# Patient Record
Sex: FEMALE | Race: BLACK OR AFRICAN AMERICAN | NOT HISPANIC OR LATINO | Employment: OTHER | URBAN - NONMETROPOLITAN AREA
[De-identification: names, ages, dates, MRNs, and addresses within clinical notes are randomized per-mention and may not be internally consistent; named-entity substitution may affect disease eponyms.]

---

## 2018-08-15 ENCOUNTER — HOSPITAL ENCOUNTER (EMERGENCY)
Facility: HOSPITAL | Age: 58
Discharge: HOME | End: 2018-08-15
Attending: EMERGENCY MEDICINE | Admitting: EMERGENCY MEDICINE

## 2018-08-15 VITALS
OXYGEN SATURATION: 98 % | DIASTOLIC BLOOD PRESSURE: 87 MMHG | HEART RATE: 64 BPM | WEIGHT: 258 LBS | TEMPERATURE: 98.8 F | HEIGHT: 66 IN | RESPIRATION RATE: 20 BRPM | SYSTOLIC BLOOD PRESSURE: 177 MMHG | BODY MASS INDEX: 41.46 KG/M2

## 2018-08-15 DIAGNOSIS — T78.40XA ALLERGIC REACTION, INITIAL ENCOUNTER: Primary | ICD-10-CM

## 2018-08-15 PROCEDURE — 99284 EMERGENCY DEPT VISIT MOD MDM: CPT

## 2018-08-15 PROCEDURE — 96374 THER/PROPH/DIAG INJ IV PUSH: CPT

## 2018-08-15 PROCEDURE — 25010000002 METHYLPREDNISOLONE PER 125 MG: Performed by: EMERGENCY MEDICINE

## 2018-08-15 PROCEDURE — 25010000002 DIPHENHYDRAMINE PER 50 MG: Performed by: EMERGENCY MEDICINE

## 2018-08-15 PROCEDURE — 96375 TX/PRO/DX INJ NEW DRUG ADDON: CPT

## 2018-08-15 RX ORDER — NEBIVOLOL 20 MG/1
20 TABLET ORAL DAILY
COMMUNITY

## 2018-08-15 RX ORDER — ACETAMINOPHEN 500 MG
1000 TABLET ORAL ONCE
Status: COMPLETED | OUTPATIENT
Start: 2018-08-15 | End: 2018-08-15

## 2018-08-15 RX ORDER — PREDNISONE 20 MG/1
40 TABLET ORAL DAILY
Qty: 8 TABLET | Refills: 0 | Status: SHIPPED | OUTPATIENT
Start: 2018-08-15 | End: 2018-08-19

## 2018-08-15 RX ORDER — POTASSIUM CHLORIDE 1.5 G/1.77G
20 POWDER, FOR SOLUTION ORAL 2 TIMES DAILY
COMMUNITY

## 2018-08-15 RX ORDER — VERAPAMIL HYDROCHLORIDE 300 MG/1
300 CAPSULE, EXTENDED RELEASE ORAL NIGHTLY
COMMUNITY

## 2018-08-15 RX ORDER — ASPIRIN 81 MG/1
81 TABLET ORAL DAILY
COMMUNITY

## 2018-08-15 RX ORDER — METHYLPREDNISOLONE SODIUM SUCCINATE 125 MG/2ML
125 INJECTION, POWDER, LYOPHILIZED, FOR SOLUTION INTRAMUSCULAR; INTRAVENOUS ONCE
Status: COMPLETED | OUTPATIENT
Start: 2018-08-15 | End: 2018-08-15

## 2018-08-15 RX ORDER — PANTOPRAZOLE SODIUM 40 MG/10ML
INJECTION, POWDER, LYOPHILIZED, FOR SOLUTION INTRAVENOUS
Status: COMPLETED
Start: 2018-08-15 | End: 2018-08-15

## 2018-08-15 RX ORDER — DIPHENHYDRAMINE HYDROCHLORIDE 50 MG/ML
25 INJECTION INTRAMUSCULAR; INTRAVENOUS ONCE
Status: COMPLETED | OUTPATIENT
Start: 2018-08-15 | End: 2018-08-15

## 2018-08-15 RX ORDER — SODIUM CHLORIDE 0.9 % (FLUSH) 0.9 %
10 SYRINGE (ML) INJECTION AS NEEDED
Status: DISCONTINUED | OUTPATIENT
Start: 2018-08-15 | End: 2018-08-15 | Stop reason: HOSPADM

## 2018-08-15 RX ORDER — CLONIDINE HYDROCHLORIDE 0.1 MG/1
0.1 TABLET ORAL 2 TIMES DAILY
COMMUNITY

## 2018-08-15 RX ORDER — PANTOPRAZOLE SODIUM 40 MG/10ML
40 INJECTION, POWDER, LYOPHILIZED, FOR SOLUTION INTRAVENOUS ONCE
Status: COMPLETED | OUTPATIENT
Start: 2018-08-15 | End: 2018-08-15

## 2018-08-15 RX ORDER — HYDRALAZINE HYDROCHLORIDE 100 MG/1
100 TABLET, FILM COATED ORAL 2 TIMES DAILY
COMMUNITY

## 2018-08-15 RX ADMIN — ACETAMINOPHEN 1000 MG: 500 TABLET ORAL at 20:14

## 2018-08-15 RX ADMIN — PANTOPRAZOLE SODIUM 40 MG: 40 INJECTION, POWDER, LYOPHILIZED, FOR SOLUTION INTRAVENOUS at 18:25

## 2018-08-15 RX ADMIN — PANTOPRAZOLE SODIUM 40 MG: 40 INJECTION, POWDER, FOR SOLUTION INTRAVENOUS at 18:25

## 2018-08-15 RX ADMIN — METHYLPREDNISOLONE SODIUM SUCCINATE 125 MG: 125 INJECTION, POWDER, FOR SOLUTION INTRAMUSCULAR; INTRAVENOUS at 18:25

## 2018-08-15 RX ADMIN — DIPHENHYDRAMINE HYDROCHLORIDE 25 MG: 50 INJECTION INTRAMUSCULAR; INTRAVENOUS at 18:25

## 2018-08-15 NOTE — ED PROVIDER NOTES
"Subjective   57-year-old female presents to the emergency department with complaint of concern for allergic reaction due to lip and face swelling.  Patient is a very poor historian and is unsure of exactly when the symptoms started.  She did take some Mucinex DM this morning and then another dose \"some time\" prior to the symptoms beginning.  She does take multiple blood pressure medications but denies any history of angioedema or facial swelling similar to this.  EMS gave the patient 25 mg of Benadryl and started an IV.  Patient denies taking any other medications prior to arrival to help her symptoms.  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  Home is in [place].  She denies any family history of known angioedema.  She denies any history of allergic reactions to any medications.  She is also complaining of a posterior headache that she has not taken anything for.        History provided by:  Patient and spouse   used: No        Review of Systems   Constitutional: Negative for activity change, appetite change, chills, diaphoresis and fever.   HENT: Positive for facial swelling. Negative for congestion, drooling, ear pain, mouth sores, postnasal drip, rhinorrhea, sinus pain, sinus pressure, sore throat, trouble swallowing and voice change.    Eyes: Negative for photophobia and visual disturbance.   Respiratory: Negative for cough, choking, shortness of breath and wheezing.    Cardiovascular: Negative.    Gastrointestinal: Negative for diarrhea, nausea and vomiting.   Endocrine: Negative.    Genitourinary: Negative.    Musculoskeletal: Negative for back pain and neck pain.   Skin: Negative for color change and rash.   Allergic/Immunologic: Negative for environmental allergies and food allergies.   Neurological: Negative.    Hematological: Negative.    Psychiatric/Behavioral: Negative.        No past medical history on file.    Allergies not " on file    No past surgical history on file.    No family history on file.    Social History     Social History   • Marital status:      Social History Main Topics   • Drug use: Unknown     Other Topics Concern   • Not on file           Objective   Physical Exam   Constitutional: She is oriented to person, place, and time. She appears well-developed and well-nourished. No distress.   HENT:   Head: Normocephalic.   Nose: Nose normal.   Mouth/Throat: Oropharynx is clear and moist. No oropharyngeal exudate.   Eyes: Pupils are equal, round, and reactive to light. Conjunctivae are normal. Right eye exhibits no discharge. Left eye exhibits no discharge.   Neck: Normal range of motion. Neck supple. No JVD present. No tracheal deviation present.   Cardiovascular: Normal rate and regular rhythm.  Exam reveals no gallop and no friction rub.    No murmur heard.  Pulmonary/Chest: Effort normal and breath sounds normal. No stridor. No respiratory distress. She has no wheezes. She has no rales.   Abdominal: Soft. Bowel sounds are normal.   Musculoskeletal: Normal range of motion. She exhibits no edema.   Lymphadenopathy:     She has no cervical adenopathy.   Neurological: She is alert and oriented to person, place, and time.   Skin: Skin is warm and dry. Capillary refill takes less than 2 seconds. She is not diaphoretic.   Psychiatric: She has a normal mood and affect.   Nursing note and vitals reviewed.      Procedures  None         ED Course      Labs Reviewed - No data to display  No results found.          MDM  Number of Diagnoses or Management Options  Allergic reaction, initial encounter:   Patient Progress  Patient progress: improved    Patient presents with concern for lipid facial swelling.  There is minimal to no swelling on exam upon arrival and evaluation.  Patient had her do been given 25 mg of Benadryl and she is given an additional 25 mg of Benadryl +125 Solu-Medrol and 40 of Protonix.  Monitor emergency  department for couple of hours with no increase in symptoms.  Pulse oximetry is monitored and is greater than 90% on room air throughout her stay in emergency department.  Patient's home medications are reviewed when her  brings them in and there is no lisinopril or other ACE inhibitor type medication would be concerning for this being angioedema.  She is also not on any ARBs.  Patient is advised that since there is no way to truly tell what has caused this reaction that she should stay away from the Mucinex for this time.  She is given a short course of additional steroids to take over the next 4 days to help prevent any return of symptoms and advised to also use Benadryl.  Patient and spouse acknowledge understanding of treatment and plan and are agreeable to discharge with outpatient management and follow-up.    Final diagnoses:   Allergic reaction, initial encounter            Sujit Gallagher,   08/15/18 2034       Sujit Gallagher,   08/15/18 0029

## 2018-08-16 NOTE — DISCHARGE INSTRUCTIONS
Please return with new or worsening symptoms.  Get the steroid field that is written and take as instructed for the next 4 days.  Also take Benadryl as needed for any return of swelling.  Follow-up with primary care when you arrive home.